# Patient Record
Sex: MALE | Race: WHITE | NOT HISPANIC OR LATINO | URBAN - NONMETROPOLITAN AREA
[De-identification: names, ages, dates, MRNs, and addresses within clinical notes are randomized per-mention and may not be internally consistent; named-entity substitution may affect disease eponyms.]

---

## 2019-01-08 ENCOUNTER — IMPORTED ENCOUNTER (OUTPATIENT)
Dept: URBAN - NONMETROPOLITAN AREA CLINIC 1 | Facility: CLINIC | Age: 69
End: 2019-01-08

## 2019-01-08 PROCEDURE — 99024 POSTOP FOLLOW-UP VISIT: CPT

## 2019-01-08 PROCEDURE — 92015 DETERMINE REFRACTIVE STATE: CPT

## 2019-01-08 NOTE — PATIENT DISCUSSION
Pseudophakia OU -  discussed findings w/patient-  some PCO noted OU not yet surgical-  new spectacle Rx issued-  monitor 6 month f/u PCO; 's Notes: MR 1/8/2019DFE 8/21/18

## 2019-03-08 PROBLEM — H35.413: Noted: 2019-03-08

## 2019-03-08 PROBLEM — Z98.42: Noted: 2018-11-02

## 2019-03-08 PROBLEM — Z98.41: Noted: 2018-11-21

## 2019-03-08 PROBLEM — H43.813: Noted: 2019-03-08

## 2019-06-20 ENCOUNTER — IMPORTED ENCOUNTER (OUTPATIENT)
Dept: URBAN - NONMETROPOLITAN AREA CLINIC 1 | Facility: CLINIC | Age: 69
End: 2019-06-20

## 2019-06-20 PROBLEM — H26.493: Noted: 2019-06-20

## 2019-06-20 PROBLEM — H35.413: Noted: 2019-03-08

## 2019-06-20 PROBLEM — H43.813: Noted: 2019-03-08

## 2019-06-20 PROBLEM — Z96.1: Noted: 2019-06-20

## 2019-06-20 PROCEDURE — 99213 OFFICE O/P EST LOW 20 MIN: CPT

## 2019-06-20 NOTE — PATIENT DISCUSSION
Pseudophakia OU -  discussed findings w/patient-  2+ PCO noted OU no vision complaints-  montior 6 month Complete w/ BAT or prn; 's Notes: MR 1/8/2019DFE 8/21/18

## 2019-09-17 ENCOUNTER — IMPORTED ENCOUNTER (OUTPATIENT)
Dept: URBAN - NONMETROPOLITAN AREA CLINIC 1 | Facility: CLINIC | Age: 69
End: 2019-09-17

## 2019-09-17 PROBLEM — H35.413: Noted: 2019-03-08

## 2019-09-17 PROBLEM — H43.813: Noted: 2019-03-08

## 2019-09-17 PROBLEM — H26.493: Noted: 2019-09-17

## 2019-09-17 PROBLEM — Z96.1: Noted: 2019-09-17

## 2019-09-17 PROBLEM — H26.491: Noted: 2020-03-02

## 2019-09-17 PROCEDURE — 99213 OFFICE O/P EST LOW 20 MIN: CPT

## 2019-09-17 NOTE — PATIENT DISCUSSION
Pseudophakia OU 3+ PCO OU-  discussed findings w/patient-  3+ PCO OU visually significant with decreased vision -  discussed referral to [address] [place] [address] for PCO eval patient agrees with plan -  refer to [address] [place] [address] for [address] [place] [address]; 's Notes: MR 1/8/2019DFE 8/21/18

## 2020-02-19 ENCOUNTER — IMPORTED ENCOUNTER (OUTPATIENT)
Dept: URBAN - NONMETROPOLITAN AREA CLINIC 1 | Facility: CLINIC | Age: 70
End: 2020-02-19

## 2020-02-19 PROCEDURE — 66821 AFTER CATARACT LASER SURGERY: CPT

## 2020-02-19 PROCEDURE — 99214 OFFICE O/P EST MOD 30 MIN: CPT

## 2020-02-19 NOTE — PATIENT DISCUSSION
Pseudophakia OU 3+ PCO OU-  discussed findings w/patient-  3+ PCO OU visually significant with decreased vision -  discussed all RBA's associated w/ YAG laser recommend proceeding with YAG Caps OS today and YAG Caps OD on Saturday in [facility].  Patient agrees with plan -  written consent obtained prior to procedure; 's Notes: MR 1/8/2019DFE 8/21/18

## 2020-02-22 ENCOUNTER — IMPORTED ENCOUNTER (OUTPATIENT)
Dept: URBAN - NONMETROPOLITAN AREA CLINIC 1 | Facility: CLINIC | Age: 70
End: 2020-02-22

## 2020-02-22 PROCEDURE — 66821 AFTER CATARACT LASER SURGERY: CPT

## 2020-02-22 NOTE — PATIENT DISCUSSION
PCO-Explained PCO and RBAs of YAG Capsulotomy to pt. -Pt elects to proceed.  YAG Caps OD today and POV in 1-2 weeks; 's Notes: MR 1/8/2019DFE 8/21/18

## 2020-03-06 ENCOUNTER — IMPORTED ENCOUNTER (OUTPATIENT)
Dept: URBAN - NONMETROPOLITAN AREA CLINIC 1 | Facility: CLINIC | Age: 70
End: 2020-03-06

## 2020-03-06 PROBLEM — Z96.1: Noted: 2020-03-06

## 2020-03-06 PROBLEM — H35.413: Noted: 2020-03-06

## 2020-03-06 PROBLEM — H43.813: Noted: 2020-03-06

## 2020-03-06 NOTE — PATIENT DISCUSSION
Pseudophakia OU -  discussed findings w/patient-  s/p YAG PC OU open capsules noted -  monitor yearly or prn; 's Notes: MR 1/8/2019DFE 8/21/18

## 2021-12-07 ENCOUNTER — IMPORTED ENCOUNTER (OUTPATIENT)
Dept: URBAN - NONMETROPOLITAN AREA CLINIC 1 | Facility: CLINIC | Age: 71
End: 2021-12-07

## 2021-12-07 PROCEDURE — 99214 OFFICE O/P EST MOD 30 MIN: CPT

## 2021-12-07 NOTE — PATIENT DISCUSSION
Pseudophakia OU -  discussed findings w/patient-  s/p YAG PC OU open capsules noted -  monitor yearly or prn Lattice Degeneration OU/PVD OU-  discussed findings w/patient-  Retina flat 360 with no breaks tears or heme. -  S&S of RD/RT reviewed with pt. -  Stressed that pt should contact our office right away with any changes or increase in symptoms.-  RTC 1 year or prn; 's Notes: MR 1/8/2019DFE 12/7/2021

## 2022-04-15 ASSESSMENT — TONOMETRY
OS_IOP_MMHG: 13
OS_IOP_MMHG: 14
OD_IOP_MMHG: 13
OS_IOP_MMHG: 13
OD_IOP_MMHG: 13
OS_IOP_MMHG: 13
OD_IOP_MMHG: 13
OS_IOP_MMHG: 12
OD_IOP_MMHG: 13
OS_IOP_MMHG: 12
OS_IOP_MMHG: 13
OD_IOP_MMHG: 12
OD_IOP_MMHG: 14
OD_IOP_MMHG: 13

## 2022-04-15 ASSESSMENT — VISUAL ACUITY
OS_GLARE: 20/50+
OS_CC: 20/20
OD_CC: 20/25-
OD_CC: 20/20
OU_CC: 20/20
OS_GLARE: 20/40
OU_CC: 20/20
OS_CC: 20/20-
OS_CC: 20/20
OS_CC: 20/20
OD_CC: 20/25
OS_CC: 20/20
OS_CC: 20/20
OU_CC: 20/20
OU_CC: 20/20
OD_CC: 20/20
OD_CC: 20/20
OS_CC: 20/25+
OD_CC: 20/20
OD_GLARE: 20/40
OU_CC: 20/15
OS_CC: 20/20

## 2024-11-18 ENCOUNTER — COMPREHENSIVE EXAM (OUTPATIENT)
Dept: URBAN - NONMETROPOLITAN AREA CLINIC 4 | Facility: CLINIC | Age: 74
End: 2024-11-18

## 2024-11-18 DIAGNOSIS — Z98.890: ICD-10-CM

## 2024-11-18 DIAGNOSIS — H35.413: ICD-10-CM

## 2024-11-18 DIAGNOSIS — H43.813: ICD-10-CM

## 2024-11-18 DIAGNOSIS — H33.303: ICD-10-CM

## 2024-11-18 DIAGNOSIS — Z96.1: ICD-10-CM

## 2024-11-18 PROCEDURE — 99214 OFFICE O/P EST MOD 30 MIN: CPT
